# Patient Record
Sex: FEMALE | Race: WHITE
[De-identification: names, ages, dates, MRNs, and addresses within clinical notes are randomized per-mention and may not be internally consistent; named-entity substitution may affect disease eponyms.]

---

## 2017-02-21 NOTE — HISTORY & PHYSICAL PRE-OP
General Information and HPI
MD Statement:
I have seen and personally examined CJ YORK and documented this H&P.
 
The patient is a 36 year old F who presented with a patient stated chief 
complaint of [].
 
History of Present Illness:
37yo  with heavy menses and permanent sterilazation desires ablation of 
endometrium.
 
Allergies/Medications
Allergies:
Coded Allergies:
NO KNOWN ALLERGIES (17)
 
Home Med list
Gabapentin (Neurontin) 100 MG CAPSULE   200 MG PO QPM SLEEP  (Reported)
Paroxetine HCl (Paxil) 20 MG TABLET   1 TAB PO DAILY ANXIETY  (Reported)
 
 
Past History
 
Surgical History
Pertinent Surgical History: tubal ligation
 
Review of Systems
 
Review of Systems
Constitutional:
Reports: no symptoms. 
EENTM:
Reports: no symptoms. 
Cardiovascular:
Reports: no symptoms. 
Respiratory:
Reports: no symptoms. 
GI:
Reports: no symptoms. 
Genitourinary:
Reports: no symptoms. 
Musculoskeletal:
Reports: no symptoms. 
Skin:
Reports: no symptoms. 
Neurological/Psychological:
Reports: no symptoms. 
Hematologic/Endocrine:
Reports: no symptoms. 
Immunologic/Allergic:
Reports: no symptoms. 
All Other Systems: Reviewed and Negative
 
Exam & Diagnostic Data
Last 24 Hrs of Vital Signs/I&O
vss
Physical Exam:
HEENT: NCAT
Chest: CTA
CV: nl S1S2
ext: no c/c/e
 
Assessment/Plan
Assessment/Plan:
menorrhagia
 
D&C, hyteroscopy, Novasure
 
As Ranked By This Provider
Problem List:
 1. Menorrhagia

## 2017-02-22 ENCOUNTER — HOSPITAL ENCOUNTER (OUTPATIENT)
Dept: HOSPITAL 68 - STS | Age: 37
End: 2017-02-22
Attending: OBSTETRICS & GYNECOLOGY
Payer: COMMERCIAL

## 2017-02-22 VITALS — WEIGHT: 228 LBS | HEIGHT: 65 IN | BODY MASS INDEX: 37.99 KG/M2

## 2017-02-22 DIAGNOSIS — F17.200: ICD-10-CM

## 2017-02-22 DIAGNOSIS — N92.1: Primary | ICD-10-CM

## 2017-02-22 DIAGNOSIS — N72: ICD-10-CM

## 2017-02-22 NOTE — OPERATIVE REPORT
Operative/Inv Procedure Report
Surgery Date: 02/22/17
Name of Procedure:
D&C, hysteroscopy, NovaSure endometrial ablation
Pre-Operative Diagnosis:
Menorrhagia
Post-Operative Diagnosis:
Same
Estimated Blood Loss: less than 50ml
Surgeon/Assistant:
DARREN BRUMFIELD MD
 
Anesthesia: laryngeal mask airway
 
Operative/Procedure Note
Note:
The patient was brought to the operating room and placed on the table in the 
dorsal supine position.  She was given adequate anesthesia and intubated with an
LMA.  She was repositioned in modified dorsal lithotomy prepped and draped in 
usual sterile fashion.  A weighted speculum was inserted into the vagina with 
help of a Randy retractor single-tooth tenaculum was attached to the anterior 
lip of the cervix.  Cervix was injected with 1% lidocaine with epinephrine 2-1/2
mL in each quadrant.  An endocervical curettage was performed.  The uterus is 
then sounded to 9 cm with a 4 cm cervix.  The cervix was serially dilated to 
accommodate the hysteroscope hysteroscope was placed and the saline infusion was
activated.  Shaggy endometrium 2 polyps were noted throughout.  The hysteroscope
was removed and the cervix was further dilated.  The NovaSure was placed into 
the uterus and opened to a width of 3-1/2 cm.  The NovaSure was begun at [address] 
and completed in less than 2 minutes.  At the end of the procedure the 
instruments were removed hemostasis was verified.  There was some bleeding noted
from the patient's left tenaculum site and a figure-of-eight 3-0 Polysorb was 
placed here for hemostasis.  The entrance returned then removed patient was 
awakened and sent to recovery in good condition
 
All needle, sponge and instrument counts were correct at the end of the 
procedure 2.

## 2017-07-05 ENCOUNTER — HOSPITAL ENCOUNTER (INPATIENT)
Dept: HOSPITAL 68 - SDA | Age: 37
LOS: 1 days | DRG: 743 | End: 2017-07-06
Attending: OBSTETRICS & GYNECOLOGY | Admitting: OBSTETRICS & GYNECOLOGY
Payer: COMMERCIAL

## 2017-07-05 VITALS — SYSTOLIC BLOOD PRESSURE: 118 MMHG | DIASTOLIC BLOOD PRESSURE: 60 MMHG

## 2017-07-05 VITALS — WEIGHT: 200 LBS | HEIGHT: 66 IN | BODY MASS INDEX: 32.14 KG/M2

## 2017-07-05 VITALS — DIASTOLIC BLOOD PRESSURE: 68 MMHG | SYSTOLIC BLOOD PRESSURE: 98 MMHG

## 2017-07-05 VITALS — SYSTOLIC BLOOD PRESSURE: 120 MMHG | DIASTOLIC BLOOD PRESSURE: 74 MMHG

## 2017-07-05 DIAGNOSIS — F17.210: ICD-10-CM

## 2017-07-05 DIAGNOSIS — F41.9: ICD-10-CM

## 2017-07-05 DIAGNOSIS — N92.0: Primary | ICD-10-CM

## 2017-07-05 DIAGNOSIS — G25.81: ICD-10-CM

## 2017-07-05 DIAGNOSIS — N39.3: ICD-10-CM

## 2017-07-05 LAB
ABSOLUTE GRANULOCYTE CT: 14.8 /CUMM (ref 1.4–6.5)
ABSOLUTE GRANULOCYTE CT: 16.1 /CUMM (ref 1.4–6.5)
ABSOLUTE GRANULOCYTE CT: 7.5 /CUMM (ref 1.4–6.5)
BASOPHILS # BLD: 0 /CUMM (ref 0–0.2)
BASOPHILS # BLD: 0 /CUMM (ref 0–0.2)
BASOPHILS # BLD: 0.1 /CUMM (ref 0–0.2)
BASOPHILS NFR BLD: 0 % (ref 0–2)
BASOPHILS NFR BLD: 0.1 % (ref 0–2)
BASOPHILS NFR BLD: 0.8 % (ref 0–2)
EOSINOPHIL # BLD: 0 /CUMM (ref 0–0.7)
EOSINOPHIL # BLD: 0 /CUMM (ref 0–0.7)
EOSINOPHIL # BLD: 0.3 /CUMM (ref 0–0.7)
EOSINOPHIL NFR BLD: 0 % (ref 0–5)
EOSINOPHIL NFR BLD: 0 % (ref 0–5)
EOSINOPHIL NFR BLD: 2.6 % (ref 0–5)
ERYTHROCYTE [DISTWIDTH] IN BLOOD BY AUTOMATED COUNT: 17.3 % (ref 11.5–14.5)
ERYTHROCYTE [DISTWIDTH] IN BLOOD BY AUTOMATED COUNT: 17.5 % (ref 11.5–14.5)
ERYTHROCYTE [DISTWIDTH] IN BLOOD BY AUTOMATED COUNT: 17.6 % (ref 11.5–14.5)
GRANULOCYTES NFR BLD: 70.7 % (ref 42.2–75.2)
GRANULOCYTES NFR BLD: 93.6 % (ref 42.2–75.2)
GRANULOCYTES NFR BLD: 95.3 % (ref 42.2–75.2)
HCT VFR BLD CALC: 37.6 % (ref 37–47)
HCT VFR BLD CALC: 38.8 % (ref 37–47)
HCT VFR BLD CALC: 40 % (ref 37–47)
LYMPHOCYTES # BLD: 0.7 /CUMM (ref 1.2–3.4)
LYMPHOCYTES # BLD: 0.8 /CUMM (ref 1.2–3.4)
LYMPHOCYTES # BLD: 2.3 /CUMM (ref 1.2–3.4)
MCH RBC QN AUTO: 26.2 PG (ref 27–31)
MCH RBC QN AUTO: 26.2 PG (ref 27–31)
MCH RBC QN AUTO: 26.3 PG (ref 27–31)
MCHC RBC AUTO-ENTMCNC: 32.5 G/DL (ref 33–37)
MCHC RBC AUTO-ENTMCNC: 32.6 G/DL (ref 33–37)
MCHC RBC AUTO-ENTMCNC: 33.3 G/DL (ref 33–37)
MCV RBC AUTO: 79 FL (ref 81–99)
MCV RBC AUTO: 80.2 FL (ref 81–99)
MCV RBC AUTO: 80.4 FL (ref 81–99)
MONOCYTES # BLD: 0.1 /CUMM (ref 0.1–0.6)
MONOCYTES # BLD: 0.2 /CUMM (ref 0.1–0.6)
MONOCYTES # BLD: 0.4 /CUMM (ref 0.1–0.6)
PLATELET # BLD: 269 /CUMM (ref 130–400)
PLATELET # BLD: 270 /CUMM (ref 130–400)
PLATELET # BLD: 271 /CUMM (ref 130–400)
PMV BLD AUTO: 9.1 FL (ref 7.4–10.4)
RED BLOOD CELL CT: 4.68 /CUMM (ref 4.2–5.4)
RED BLOOD CELL CT: 4.91 /CUMM (ref 4.2–5.4)
RED BLOOD CELL CT: 4.98 /CUMM (ref 4.2–5.4)
WBC # BLD AUTO: 10.6 /CUMM (ref 4.8–10.8)
WBC # BLD AUTO: 15.8 /CUMM (ref 4.8–10.8)
WBC # BLD AUTO: 16.9 /CUMM (ref 4.8–10.8)

## 2017-07-05 PROCEDURE — 0UTC7ZZ RESECTION OF CERVIX, VIA NATURAL OR ARTIFICIAL OPENING: ICD-10-PCS | Performed by: OBSTETRICS & GYNECOLOGY

## 2017-07-05 PROCEDURE — C1771 REP DEV, URINARY, W/SLING: HCPCS

## 2017-07-05 PROCEDURE — 0UT97ZZ RESECTION OF UTERUS, VIA NATURAL OR ARTIFICIAL OPENING: ICD-10-PCS | Performed by: OBSTETRICS & GYNECOLOGY

## 2017-07-05 PROCEDURE — 0TSD0ZZ REPOSITION URETHRA, OPEN APPROACH: ICD-10-PCS | Performed by: UROLOGY

## 2017-07-05 PROCEDURE — P9016 RBC LEUKOCYTES REDUCED: HCPCS

## 2017-07-05 NOTE — NUR
PATIENT REQUESTING NIGHTTIME MEDS-
GABAPENTIN 200 MG PO AND PAXIL 20 MG PO
PER TELEPHONE ORDER FROM ISELA UREÑA TO GIVE.
ORDERS FAXED TO PHARMACY.

## 2017-07-05 NOTE — OPERATIVE REPORT
Operative/Inv Procedure Report
Surgery Date: 07/05/17
Name of Procedure:
urethral sling, cystoscopy
Pre-Operative Diagnosis:
stress incontinence
Post-Operative Diagnosis:
same
Estimated Blood Loss: 50ml to 100ml
Surgeon/Assistant:
Sharon Lam
 
Anesthesia: general endotracheal tube
Implants:
vaginal mesh
Drains:
16fr guallpa
Complications:
none
Condition:
stable
Operative Indication:
stress incontinence
 
Operative/Procedure Note
Note:
This an operative dictation on patient Dee Fuentes.  Patient was identified
in the holding area and consented for a urethral sling and cystoscopy.  This was
a additional procedure to the vaginal hysterectomy she was having with and kingston Lacy.  The risks benefits and alternatives of the surgery were given and all 
questions were answered.  She wished to proceed.
 
Patient was taken to the operating room by Dr. Oquendo and had the 
vaginal hysterectomy procedure with Ajay Modi MD as the assistant.  Once
they completed the hysterectomy portion of the case, I began the urethral sling 
portion.  A 16 Moldovan Guallpa catheter was placed in the bladder was emptied.  The
full Guallpa was clamped and placed on the patient's abdomen.  0.5% Marcaine with 
epinephrine was infiltrated into the anterior vaginal wall.  Incision was made 
beneath the urethra and the vaginal flaps are created taking care not to injure 
the urethra.  The Altis Sling kit was then opened and the trochars provided were
used to place the sling in the patient's left and right obturator fascia.  Sling
was seen to be in good position and the Prolene tightening suture was then used 
to tighten the urethral sling mesh ensuring that it was in a flat orientation 
with a DeBakey between the urethra and the sling.  Once it was seen to be in 
good position the tightening suture was cut and the area was copiously irrigated
with bacitracin irrigation.  Incision was closed with 3-0 Vicryl running locking
every third suture.  There was no vaginal mesh within the vaginal fornices.  A 
cystoscopy was performed the bladder was globally inspected.  There was no mesh 
in the bladder or the urethra.  Methylene blue had been given prior to the start
of the sling to ensure the patency of the ureteral orifices after the vaginal 
hysterectomy.  There was excellent blue E Deflux emanating from both ureteral 
orifices.  The bladder was emptied.  The sponge and needle count were correct at
the end of the case.  2 inch vaginal packing impregnated with bacitracin 
ointment was placed into the vaginal vault.  Patient tolerated the procedure 
well.
Findings:
no mesh in bladder, urethra or vaginal fornices.
Excellent blue efflux from both ureteral orifices
Discharge Disposition: PACU

## 2017-07-05 NOTE — NUR
QUESTION REGARDING ADDITIONAL DOSE OF ANTIBIOTICS-
REC'D ANCEF IN OR AT 0930
GUMARO RODRIGES, CALLED DR BRUMFIELD'S OFFICE/ANSWERING SERVICE 2X BEFORE SHE
LEFT, NO CALL BACK AT THIS TIME
I JUST CALLED AND LEFT A MESSAGE WITH THE ANSWERING SERVICE, DR FUCHS IS
COVERING NOW.  WILL AWAIT CALL BACK...

## 2017-07-06 VITALS — DIASTOLIC BLOOD PRESSURE: 60 MMHG | SYSTOLIC BLOOD PRESSURE: 102 MMHG

## 2017-07-06 VITALS — DIASTOLIC BLOOD PRESSURE: 60 MMHG | SYSTOLIC BLOOD PRESSURE: 106 MMHG

## 2017-07-06 LAB
ABSOLUTE GRANULOCYTE CT: 12.1 /CUMM (ref 1.4–6.5)
BASOPHILS # BLD: 0 /CUMM (ref 0–0.2)
BASOPHILS NFR BLD: 0.3 % (ref 0–2)
EOSINOPHIL # BLD: 0 /CUMM (ref 0–0.7)
EOSINOPHIL NFR BLD: 0 % (ref 0–5)
ERYTHROCYTE [DISTWIDTH] IN BLOOD BY AUTOMATED COUNT: 17.5 % (ref 11.5–14.5)
GRANULOCYTES NFR BLD: 82.2 % (ref 42.2–75.2)
HCT VFR BLD CALC: 35.7 % (ref 37–47)
LYMPHOCYTES # BLD: 1.9 /CUMM (ref 1.2–3.4)
MCH RBC QN AUTO: 26.5 PG (ref 27–31)
MCHC RBC AUTO-ENTMCNC: 32.5 G/DL (ref 33–37)
MCV RBC AUTO: 81.3 FL (ref 81–99)
MONOCYTES # BLD: 0.7 /CUMM (ref 0.1–0.6)
PLATELET # BLD: 258 /CUMM (ref 130–400)
PMV BLD AUTO: 9.4 FL (ref 7.4–10.4)
RED BLOOD CELL CT: 4.39 /CUMM (ref 4.2–5.4)
WBC # BLD AUTO: 14.7 /CUMM (ref 4.8–10.8)

## 2017-07-06 NOTE — SURGICAL DISCHARGE SUMMARY
Visit Information
 
Visit Dates
Admission Date:
07/05/17
 
Discharge Date:
7/6/17
 
 
History of Present Illness
Chief Complaint:
MENORRHAGIA
 
Medical History
Blood Transfusion Hx: No
Neurological: NONE
EENT: NONE
Cardiovascular: NONE
Respiratory: NONE
Gastrointestinal: NONE
Hepatic: NONE
Renal: NONE
Musculoskeletal: NONE
Psychiatric: NONE
Endocrine: NONE
Blood Disorders: NONE
Cancer(s): NONE
GYN/Reproductive: EXCESSIVE BLEEDING
History of MRSA: No
History of VRE: No
History of CDIFF: No
Isolation History: Standard
 
Surgical History
Pertinent Surgical History: hysterectomy, tubal ligation, endometrial ablation
 
Psychosocial History
Where Do You Live? Home
Who Do You Live With? Friend
What is Your Primary Language? English
Review of Systems:
NEG
 
Hospital Course
 
Course
Attending Physician:
DARREN BRUMFIELD MD
 
[facility] Care Phys[facility]
PT UNDERSENT TVH AND SLING WIHOUT COMPLICATION.  SENT TO  IN SATISFACTORY 
CONDITION. POD 1 PT WITHOUT C/O. PACKING REMOVED.  H&H STABLE. VITAL SIGNS 
STABLE AND AFEBRILE.  VOIDING TRIAL SUCCESSFUL AND PT DISCHARGED HOME.
Allergies:
Coded Allergies:
NO KNOWN ALLERGIES (02/16/17)
 
 
Disposition Summary
 
Disposition
Principal Diagnosis:
MENORRHAGIA
Additional Diagnosis:
S/P VAGINAL HYSTERECTOMY
Discharge Disposition: home or self care
 
Discharge Instructions
 
General Discharge Information
Code Status: Full Code
Patient's Diet:
REGULAR
Patient's Activity:
PELVIC REST
Follow-Up Instructions/Appts:
1 WK MANOLO
2 WK ELIGIO ODELL
 
Medications at Discharge
Discharge Medications:
Continue taking these medications:
Paroxetine HCl (Paxil) 20 MG TABLET
    1 Tablet ORAL NIGHTLY
    Comments:
       NOT GIVEN IN HOSPITAL
 
Gabapentin (Neurontin) 100 MG CAPSULE
    200 Milligram ORAL Every night
    Comments:
       NOT GIVEN IN HOSPITAL
 
Start taking the following new medications:
Ibuprofen (Ibuprofen) 800 MG TABLET
    800 Milligram ORAL EVERY 8 HOURS AS NEEDED as needed for MILD PAIN 1-3
    Qty = 16
    No Refills
    Comments:
       NOT GIVEN IN HOSPITAL
 
Oxycodone HCl/Acetaminophen (Percocet 5-325 MG Tablet) 5 MG-325 MG TABLET
    1 Tablet ORAL EVERY 4 HOURS AS NEEDED as needed for SEVERE PAIN 7-10
    Qty = 15
    No Refills
    Comments:
       Last Taken: 7/6/17
             Time: 1030 AM

## 2017-07-06 NOTE — NUR
NURSING NOTE: PATIENT LEFT FLOOR VIA STRETCHER WITH [facility] FOR DISCHARGE TO SNF.
PATIENT A/OX3, DENIES PAIN AT THIS TIME. IV DISCONTINUED. ALL BELONGINGS LEFT
WITH PATIENT. DISCHARGE PAPERWORK GIVEN TO [facility] STAFF.

## 2017-07-06 NOTE — NUR
NURSING NOTE: PATIENT LEFT FLOOR ON OWN AMBULATION FOR DISCHARGE HOME. PATIENT
A/OX3, STEADY GAIT, ALL PRESCRIPTIONS AND DISCHARGE PAPERWORK GIVEN TO PATIENT
AND EXPLAINED. PATIENT HAD NO QUESTIONS FOLLOWING DISCHARGE INSTRUCTIONS. IV
DISCONTINUED.

## 2017-07-06 NOTE — PN- GENERAL SURGERY
Subjective
Subjective:
NO C/O
Review of Systems:
NEG
 
Objective
Vital Signs and I&Os
Vital Signs
 
 
Date Time Temp Pulse Resp B/P B/P Pulse O2 O2 Flow FiO2
 
     Mean Ox Delivery Rate 
 
07/06 0652 98.6 72 20 102/60  97 Room Air  
 
07/05 2247 98.3 83 20 118/60  94   
 
07/05 1700  77  120/74  95 Room Air  
 
07/05 1545      96 Nasal 1.0L 
 
       Cannula  
 
07/05 1450 97.9 75 16 98/68  96 Nasal 2.0L 
 
       Cannula  
 
 
 Intake & Output
 
 
 07/06 1600 07/06 0800 07/06 0000 07/05 1600 07/05 0800 07/05 0000
 
Intake Total  1240 3975   
 
Output Total  650 1825   
 
Balance  590 2150   
 
       
 
Intake, IV  1000 3375   
 
Intake, Oral  240 600   
 
Output, Other   200   
 
Output, Urine  650 1625   
 
Patient    200 lb  
 
Weight      
 
Weight    Reported by Patient  
 
Measurement      
 
Method      
 
 
 
Physical Exam:
ABD SOFT
EXT NT
VAG PAKING REMOVED
 
Assessment/Plan
Assessment/Plan
S/P TVH/SLING
BERNARD REMOVED; VOIDING TRIAL; IF PASSES, DISCHARGE HOME
IF FAILS, DISCHARGE HOME WITH BERNARD/LEG BAG
F/U LEEANNE 1 WEEK
F/U VV 2 WEEKS
Problem List:
 1. Menorrhagia
 
 
Core Measures/Miscellaneous
 
Venous Thromboembolism
VTE Risk Factors: Surgery
VTE Contraindications: No Contraindications
VTE Diagnosis: No
 
Beta Blocker
Is Beta Blocker a Home Med? No
 
Antibiotics
Is Patient on Antibiotics? No

## 2017-07-07 NOTE — OPERATIVE REPORT
Operative/Inv Procedure Report
Surgery Date: 07/05/17
Name of Procedure:
Total vaginal hysterectomy
Pre-Operative Diagnosis:
Menorrhagia
Post-Operative Diagnosis:
Same
Estimated Blood Loss: 50ml to 100ml
Surgeon/Assistant:
ELIGIO ODELL MD,DARREN Luu M.D.
 
Anesthesia: general endotracheal tube
 
Operative/Procedure Note
Note:
The patient was brought to the operating room placed on the OR table in the 
dorsal supine position.  She was given adequate anesthesia and successfully 
intubated.  She was repositioned in modified dorsal lithotomy.  She was prepped 
and draped in usual sterile fashion.  A weighted speculum was inserted into the 
vagina with help of a [place] retractor single-tooth tenaculum was attached to 
the anterior lip of the cervix in 1 to the posterior lip of the cervix.  The 
cervical vaginal junction was injected with Pitressin as well as lidocaine with 
epinephrine.  Using the Bovie circumferentially incision was made and this was 
pushed back to reveal the peritoneum.  The peritoneum was grasped anteriorly 
with a forcep and entered sharply with the Montez scissors.  The Randy retractor 
was placed into the peritoneal cavity through this opening.  Posteriorly the 
vaginal mucosa was pushed back into the peritoneum was noted grasped and entered
sharply with the Montez scissors as well.  The gooseneck weighted speculum was 
inserted through this opening throughout the procedure.  Traction was held on 
the cervix throughout the procedure.  The right uterosacral ligament was clamped
transected and suture ligated with 0 Polysorb and tagged.  The same procedure is
repeated on the left good hemostasis.  The right uterine artery was left with a 
Alaina clamp transected suture ligated with 0 Polysorb with good hemostasis.  
Same procedure is repeated on the left.  The right cardinal ligaments were then 
transected using clamped transected and suture ligated with 0 Polysorb.  The 
same procedure is repeated on the left.  There was some bleeding noted from a 
loose pedicle and this was grasped again with a Alaina clamp and suture ligated 
with 0 Polysorb to good effect.  The right utero-ovarian complex was grasped 
with a Alaina clamp and suture ligated with 0 Polysorb with good effect.  Same 
procedure is repeated on the right with good hemostasis.  The specimen was 
removed and sent to pathology.  All pedicles were visualized and there was some 
bleeding noted to be coming from the right uterine artery pedicle this was 
clamped and suture ligated with 0 Polysorb with good hemostasis.  Further 
inspection revealed no bleeding pedicles.  The vaginal cuff was then oversewn 
using 0 Polysorb in a running locking fashion.  The uterosacral ligaments were 
plicated and attached to the vaginal cuff with 0 Polysorb in a free suture.  At 
this point the patient was left for Dr. Lam to perform her sling operation.  
At the end of this procedure WERE correct of needle instruments and pads 2.

## 2018-01-31 ENCOUNTER — HOSPITAL ENCOUNTER (EMERGENCY)
Dept: HOSPITAL 68 - ERH | Age: 38
End: 2018-01-31
Payer: COMMERCIAL

## 2018-01-31 VITALS — SYSTOLIC BLOOD PRESSURE: 127 MMHG | DIASTOLIC BLOOD PRESSURE: 84 MMHG

## 2018-01-31 VITALS — WEIGHT: 230 LBS | BODY MASS INDEX: 38.32 KG/M2 | HEIGHT: 65 IN

## 2018-01-31 DIAGNOSIS — Z87.891: ICD-10-CM

## 2018-01-31 DIAGNOSIS — R06.02: Primary | ICD-10-CM

## 2018-01-31 LAB
ABSOLUTE GRANULOCYTE CT: 6.2 /CUMM (ref 1.4–6.5)
BASOPHILS # BLD: 0 /CUMM (ref 0–0.2)
BASOPHILS NFR BLD: 0.3 % (ref 0–2)
EOSINOPHIL # BLD: 0.4 /CUMM (ref 0–0.7)
EOSINOPHIL NFR BLD: 4.1 % (ref 0–5)
ERYTHROCYTE [DISTWIDTH] IN BLOOD BY AUTOMATED COUNT: 15.5 % (ref 11.5–14.5)
GRANULOCYTES NFR BLD: 67.8 % (ref 42.2–75.2)
HCT VFR BLD CALC: 42.4 % (ref 37–47)
LYMPHOCYTES # BLD: 2.1 /CUMM (ref 1.2–3.4)
MCH RBC QN AUTO: 26.8 PG (ref 27–31)
MCHC RBC AUTO-ENTMCNC: 32.7 G/DL (ref 33–37)
MCV RBC AUTO: 82 FL (ref 81–99)
MONOCYTES # BLD: 0.5 /CUMM (ref 0.1–0.6)
PLATELET # BLD: 327 /CUMM (ref 130–400)
PMV BLD AUTO: 8.4 FL (ref 7.4–10.4)
RED BLOOD CELL CT: 5.18 /CUMM (ref 4.2–5.4)
WBC # BLD AUTO: 9.2 /CUMM (ref 4.8–10.8)

## 2018-01-31 NOTE — RADIOLOGY REPORT
EXAMINATION:
XR CHEST
 
CLINICAL INFORMATION:
Shortness of breath. Worsening on exertion.
 
COMPARISON:
None
 
TECHNIQUE:
2 views of the chest were obtained.
 
FINDINGS:
No significant abnormality is noted involving the heart, lungs, mediastinum,
bony thorax or soft tissues.
 
IMPRESSION:
Unremarkable examination.

## 2018-01-31 NOTE — ED DYSPNEA/ASTHMA COMPLAINT
History of Present Illness
 
General
Chief Complaint: Dyspnea (COPD, CHF, Other)
Stated Complaint: SOB
Source: patient
Exam Limitations: no limitations
 
Vital Signs & Intake/Output
Vital Signs & Intake/Output
 Vital Signs
 
 
Date Time Temp Pulse Resp B/P B/P Pulse O2 O2 Flow FiO2
 
     Mean Ox Delivery Rate 
 
 1728      96   
 
 1501 97.3 110 20 127/84  94 Room Air  
 
 
 
Allergies
Coded Allergies:
NO KNOWN ALLERGIES (17)
 
Reconcile Medications
Albuterol Sulfate (Proair Hfa) 90 MCG HFA.AER.AD   2 PUF INH Q4-6 PRN PRN SOB
Gabapentin (Neurontin) 100 MG CAPSULE   200 MG PO QPM SLEEP  (Reported)
Ibuprofen 800 MG TABLET   800 MG PO Q8P PRN MILD PAIN 1-3
Oxycodone HCl/Acetaminophen (Percocet 5-325 MG Tablet) 5 MG-325 MG TABLET   1 
TAB PO Q4P PRN SEVERE PAIN 7-10
Paroxetine HCl (Paxil) 20 MG TABLET   1 TAB PO NIGHTLY ANXIETY  (Reported)
Prednisone 50 MG TABLET   1 TAB PO DAILY BRONCHITIS 
 
Triage Note:
PT TO ED C/O "I CAN'T BREATH". PT WAS SEEN AT
URGENT CARE ON MONDAY AND SENT TO ED FOR EVAL BUT
PT DID NOT STAY FOR EVAL DUE TO WAIT TIME.
WENT TO PCP TODAY FOR STILL NOT FEELING WELL AND
WAS ADVISED TO COME TO ED. RA SATS 95%. PT APPEARS
SOB WITH SITTING. PCP CALLED AND WOULD LIKE FLU
SWAB, CXR, AND R/O PE.
Triage Nurses Notes Reviewed? yes
Onset: Abrupt
Duration: day(s): (3), constant, changing over time
Timing: single episode today
Severity: mild, moderate
Activities at Onset: none
Prior Episodes/Possible Cause: occasional episodes
Modifying Factors:
Worsens With: movement. 
Associated Symptoms: anxiety, cough, wheezing
LMP (ages 10-50): unknown
Pregnant: No
Patient currently breastfeeds: No
HPI:
37 YEAR OLD FEMALE WITH NO PMH PRESENTS FOR EVAL OF SOB. PT RPEORTS SYMPTOMS 
STARTED ABOUT 3-4 DAYS AGO AND GETTING WORSE.  Patient reports shortness of 
breath both at rest that worsens with exertion.  She reports cough productive of
CLEAR sputum that is worse at night causing difficult is sleeping.  She also 
reports chest pain with cough.  No hemoptysis lower extremity edema nausea 
vomiting or diarrhea.  No history of underlying lung disease.  She does not have
a prescription for albuterol.  She quit smoking about a month ago.  Nose 
negative family history of heart disease.  No recent surgery or trauma.
(Romaine Emmanuel)
 
Past History
 
Travel History
Traveled to [place] past 21 day No
 
Medical History
Any Pertinent Medical History? see below for history
Neurological: NONE
EENT: NONE
Cardiovascular: NONE
Respiratory: NONE
Gastrointestinal: NONE
Hepatic: NONE
Renal: NONE
Musculoskeletal: NONE
Psychiatric: NONE
Endocrine: NONE
Blood Disorders: NONE
Cancer(s): NONE
GYN/Reproductive: EXCESSIVE BLEEDING
History of MRSA: No
History of VRE: No
History of CDIFF: No
 
Surgical History
Surgical History: hysterectomy, tubal ligation, endometrial ablation
 
Psychosocial History
Who do you live with Friend
What is your primary language English
Tobacco Use: Quit >30 days ago
ETOH Use: denies use
Illicit Drug Use: denies illicit drug use
 
Family History
Hx Contributory? No
(Romaine Emmanuel)
 
Review of Systems
 
Review of Systems
Constitutional:
Reports: weakness. 
EENTM:
Reports: nasal congestion. 
Respiratory:
Reports: see HPI, cough, short of breath, sputum production, wheezing. 
Cardiovascular:
Reports: see HPI, chest pain. 
GI:
Reports: no symptoms. 
Genitourinary:
Reports: no symptoms. 
Musculoskeletal:
Reports: no symptoms. 
Skin:
Reports: no symptoms. 
Neurological/Psychological:
Reports: no symptoms. 
Hematologic/Endocrine:
Reports: no symptoms. 
Immunologic/Allergic:
Reports: no symptoms. 
All Other Systems: Reviewed and Negative
(Romaine Emmanuel)
 
Physical Exam
 
Physical Exam
General Appearance: well developed/nourished, no apparent distress, alert, awake
Head: atraumatic, normal appearance
Eyes:
Bilateral: normal appearance, PERRL, EOMI. 
Ears, Nose, Throat: normal pharynx, hearing grossly normal, nasal congestion, 
moist mucus membranes
Neck: normal inspection, supple, full range of motion, NO jvd
Respiratory: no respiratory distress, rhonchi, wheezing, CHEST WALL TENDER TO 
PALPATION OVER THE STERNUM RIGHT AND LEFT CHEST
Cardiovascular: regular rate/rhythm, normal peripheral pulses
Peripheral Pulses:
2+ radial (R), 2+ radial (L)
Gastrointestinal: soft, non-tender
Extremities: normal inspection, normal range of motion, no edema
Neurologic/Psych: no motor/sensory deficits, awake, alert, oriented x 3, normal 
gait
Skin: intact, normal color, warm/dry
Lymphatic: no anterior cervical kristine
 
Core Measures
ACS in differential dx? No
CVA/TIA Diagnosis No
Sepsis Present: No
Sepsis Focused Exam Completed? No
(Francisco Javier PETERS,Romaine)
 
Progress
Differential Diagnosis: asthma, AMI, bronchitis, costochondritis, CHF, COPD, 
musculoskeletal pain, pericarditis, pulmonary embolism, pneumonia, pneumothorax,
rib fracture
Plan of Care:
 Orders
 
 
Procedure Date/time Status
 
Add-on Test (ER Only)  1530 Active
 
TROPONIN LEVEL  1521 Complete
 
MAGNESIUM  1521 Complete
 
HUMAN BETA HCG SCREEN  1521 Complete
 
RAPID VIRAL INFLUENZA A  1508 Complete
 
D-DIMER  1504 Complete
 
COMPREHENSIVE METABOLIC PANEL  1504 Complete
 
CBC WITHOUT DIFFERENTIAL  1504 Complete
 
EKG  1504 Active
 
 
 Laboratory Tests
 
 
 
18 1558:
Anion Gap 13, Estimated GFR > 60, BUN/Creatinine Ratio 15.7, Glucose 92, Calcium
8.9, Magnesium 1.9, Total Bilirubin 0.1  L, AST 14, ALT 27, Alkaline Phosphatase
138  H, Troponin I < 0.01, Total Protein 7.1, Albumin 4.1, Globulin 3.0, Albumin
/Globulin Ratio 1.4, Total Beta HCG NEGATIVE, D-Dimer High Sensitivty < 200, CBC
w Diff NO MAN DIFF REQ, RBC 5.18, MCV 82.0, MCH 26.8  L, MCHC 32.7  L, RDW 15.5 
H, MPV 8.4, Gran % 67.8, Lymphocytes % 22.5, Monocytes % 5.3, Eosinophils % 4.1,
Basophils % 0.3, Absolute Granulocytes 6.2, Absolute Lymphocytes 2.1, Absolute 
Monocytes 0.5, Absolute Eosinophils 0.4, Absolute Basophils 0
 Microbiology
 1510  NASOPHARYN: Influenza Virus A & B Rapid Smear - COMP
 
Patient seen and evaluated.  She currently is afebrile and no evidence of 
respiratory distress.  She does have wheezing and rhonchi auscultated 
bilaterally.  This was improved after DuoNeb.  Her chest wall is tender to 
palpation.  Chest x-ray does not show any evidence of pneumonia.  D-dimer is 
negative EKG and troponin negative and stable.  Patient will be treated for 
acute bronchitis with prednisone and albuterol.  Advised her to also use Mucinex
/Robitussin for cough Tylenol or IBUProfen for pain rest and drink plenty of 
fluids.  Discussed return precautions in detail patient is nontoxic-appearing 
and agrees the plan.
Diagnostic Imaging:
Viewed by Me: Radiology Read.  Discussed w/RAD: Radiology Read. 
CXR Impression: PATIENT: CJ YORK  MEDICAL RECORD NO: [id number] PRESENT 
AGE: 37  PATIENT ACCOUNT NO: [id number] : 79  LOCATION: [facility] ORDERING 
PHYSICIAN: Romaine PETERS     SERVICE DATE:  EXAM TYPE: RAD - XRY-
CHEST XRAY, TWO VIEWS EXAMINATION: XR CHEST CLINICAL INFORMATION: Shortness of 
breath. Worsening on exertion. COMPARISON: None TECHNIQUE: 2 views of the chest 
were obtained. FINDINGS: No significant abnormality is noted involving the heart
, lungs, mediastinum, bony thorax or soft tissues. IMPRESSION: Unremarkable 
examination. DICTATED BY: Enoc Ruiz MD  DATE/TIME DICTATED:18 
:RAD.MAYORGA  DATE/TIME TRANSCRIBED:18 CONFIDENTIAL, 
DO NOT COPY WITHOUT APPROPRIATE AUTHORIZATION.
Initial ED EKG: normal sinus rhythm (RATE 98)
(Romaine Emmanuel)
 
Departure
 
Departure
Disposition: HOME OR SELF CARE
Condition: Stable
Clinical Impression
Primary Impression: Shortness of breath
Referrals:
Patient Has No Primary Care Dr (PCP/Family)
 
Additional Instructions:
Rest and drink plenty of fluids.  Take steroids as directed for the full course.
 Use Proventil inhaler 2 puffs every 4-6 hours as needed for cough or shortness 
of breath.  MAKE A follow-up appointment with YOUR primary care doctor as soon 
as possible.  Monitor symptoms return with any concerns.
Departure Forms:
Customer Survey
General Discharge Information
Prescriptions:
Current Visit Scripts
Prednisone 1 TAB PO DAILY  
     #5 TAB 
 
Albuterol Sulfate (Proair Hfa) 2 PUF INH Q4-6 PRN PRN SOB 
     #1 INHAL 
 
 
(Romaine Emmanuel)
 
PA/NP Co-Sign Statement
Statement:
ED Attending supervision documentation-
 
[] I saw and evaluated the patient. I have also reviewed all the pertinent lab 
results and diagnostic results. I agree with the findings and the plan of care 
as documented in the PA's/NP's documentation. 
 
[X] I have reviewed the ED Record and agree with the PA's/NP's documentation.
 
[] Additions or exceptions (if any) to the PAs/NP's note and plan are 
summarized below:
[]
 
(Zacarias Moran DO)
 
Critical Care Note
 
Critical Care Note
Critical Care Time: non-applicable
(Romaine Emamnuel)
 
ED Attending Observation
Initial Observation Note:
I have seen and personally examined CJ YORK 
on 18 at 1412. 
 
I agree with the current emergency department documentation.  The disposition 
(admission or discharge) is uncertain at this time, she needs a period of 
observation for the following reason(s): 
 
The ED Nurse caring for this patient has been personally informed as to what the
patient is being observed for.
 
(Romaine Emmanuel)

## 2018-09-26 ENCOUNTER — HOSPITAL ENCOUNTER (OUTPATIENT)
Dept: HOSPITAL 68 - ERH | Age: 38
Setting detail: OBSERVATION
LOS: 1 days | End: 2018-09-27
Attending: SURGERY | Admitting: SURGERY
Payer: COMMERCIAL

## 2018-09-26 VITALS — HEIGHT: 65 IN | WEIGHT: 230 LBS | BODY MASS INDEX: 38.32 KG/M2

## 2018-09-26 VITALS — DIASTOLIC BLOOD PRESSURE: 74 MMHG | SYSTOLIC BLOOD PRESSURE: 132 MMHG

## 2018-09-26 DIAGNOSIS — E66.9: ICD-10-CM

## 2018-09-26 DIAGNOSIS — F41.9: ICD-10-CM

## 2018-09-26 DIAGNOSIS — F17.200: ICD-10-CM

## 2018-09-26 DIAGNOSIS — K80.12: Primary | ICD-10-CM

## 2018-09-26 LAB
ABSOLUTE GRANULOCYTE CT: 6.4 /CUMM (ref 1.4–6.5)
BASOPHILS # BLD: 0 /CUMM (ref 0–0.2)
BASOPHILS NFR BLD: 0.5 % (ref 0–2)
EOSINOPHIL # BLD: 0.4 /CUMM (ref 0–0.7)
EOSINOPHIL NFR BLD: 4.4 % (ref 0–5)
ERYTHROCYTE [DISTWIDTH] IN BLOOD BY AUTOMATED COUNT: 14.7 % (ref 11.5–14.5)
GRANULOCYTES NFR BLD: 71.8 % (ref 42.2–75.2)
HCT VFR BLD CALC: 40.6 % (ref 37–47)
LYMPHOCYTES # BLD: 1.9 /CUMM (ref 1.2–3.4)
MCH RBC QN AUTO: 27.3 PG (ref 27–31)
MCHC RBC AUTO-ENTMCNC: 33.2 G/DL (ref 33–37)
MCV RBC AUTO: 82.1 FL (ref 81–99)
MONOCYTES # BLD: 0.2 /CUMM (ref 0.1–0.6)
PLATELET # BLD: 333 /CUMM (ref 130–400)
PMV BLD AUTO: 9.1 FL (ref 7.4–10.4)
RED BLOOD CELL CT: 4.95 /CUMM (ref 4.2–5.4)
WBC # BLD AUTO: 8.9 /CUMM (ref 4.8–10.8)

## 2018-09-26 PROCEDURE — G0378 HOSPITAL OBSERVATION PER HR: HCPCS

## 2018-09-26 NOTE — PN- GENERAL SURGERY
Subjective
Subjective:
Patient alert and oriented, complains of abdominal fullness
pain controlled, no appetite
VSS afebrile
 
Objective
Vital Signs and I&Os
Vital Signs
 
 
Date Time Temp Pulse Resp B/P B/P Pulse O2 O2 Flow FiO2
 
     Mean Ox Delivery Rate 
 
09/26 1623 98.8 76 18 123/62  96   
 
09/26 1501 98.2 88 18 110/68  98   
 
09/26 1300 98.2 77 18 107/56  99   
 
09/26 1100 98.2 88 18 105/63  98   
 
09/26 0940 98.2 62 18 110/68  99   
 
09/26 0629 97.8 85 18 116/55  95 Room Air  
 
09/26 0600      100 Room Air  
 
09/26 0441 97.8        
 
09/26 0420 97.8        
 
09/26 0248 97.8 94 16 133/88  98   
 
 
 Intake & Output
 
 
 09/26 1600 09/26 0800 09/26 0000 09/25 1600 09/25 0800 09/25 0000
 
Intake Total  0    
 
Output Total      
 
Balance  0    
 
       
 
Intake, Oral  0    
 
Patient  230 lb    
 
Weight      
 
 
 
Physical Exam:
alert and oriented, VSS
chest - CTA symmetric
heart- RRR without MRG
Abdomen - distended, generalized soreness
POS BS, dressings CDI
bilateral lower extremities soft with good perfusion
 
Assessment/Plan
Assessment/Plan
postop lap jaci
pain controlled
advance diet
D/C fluids when tolarating POs
OOB ambulating
patient in observation status- plan to be discharged tomorrow to home
 
 
 
Core Measures
 
Venous Thromboembolism
VTE Risk Factors Other
No Mechanical VTE Prophylaxis d/t Surgical Contraindication
No VTE Pharm Prophylaxis d/t Surgical Contraindication

## 2018-09-26 NOTE — OPERATIVE REPORT
Operative/Inv Procedure Report
Surgery Date: 09/26/18
Name of Procedure:
Laparoscopic Cholecystectomy
Pre-Operative Diagnosis:
Acute cholecystitis/Cholelithiasis
Post-Operative Diagnosis:
Same
Estimated Blood Loss: 50ml to 100ml
Surgeon/Assistant:
Gavino Grady DO
Anesthesia: general endotracheal tube
IV Fluids:
1500 cc
Drains:
None
Specimens:
Gallbladder
Complications:
None
Condition:
Stable
Operative Indication:
This is a 38-year-old female presented to the emergency room with abdominal 
pain.  After appropriate workup was completed the patient was diagnosed with 
acute cholecystitis.  A laparoscopic possible open cholecystectomy was discussed
in detail.  All risks including but not limited to bleeding, infection, bile 
leak, and injury to surrounding duct/bowel were discussed in detail.  The 
patient understood everything and decided to proceed.
 
Operative/Procedure Note
Note:
The patient was brought to the operating room and placed on the operating room 
table in supine position.  Venodyne stockings were placed and adequate general 
endotracheal anesthesia was obtained.  The patient was prepped and draped in 
standard surgical fashion.  We began the procedure by making a 2 cm transverse 
incision in the infraumbilical crease.  The incision was carried down to the 
fascia, once the fascia was clearly visualized it was picked up between 2 kocher
clamps.  The fascia was divided in the midline and once we entered the 
peritoneum 2 stay 0 Vicryl sutures were placed on each side.  A 12 mm blunt port
was inserted and the abdominal cavity was insufflated to 15 mmHg.  A 10 mm 30 
laparoscope was introduced and upon initial examination no obvious gross 
pathology was seen.  We did note a distended gallbladder in the right upper 
quadrant.  Accessory trocars were placed, all 5 mm, one in the epigastrium and 2
in the right upper quadrant (one in the midclavicular line and one in the 
anterior axillary line, both 2 fingerbreadths below the costal margin).  The 
gallbladder was grasped with the lateralmost trocar and retracted up over the 
liver.  Using the other 2 accessory trochars the infundibulum was grasped and 
the peritoneum was lysed using blunt dissection and using hook electrocautery.  
The cystic duct and cystic artery were visualized.  Of note the gallbladder was 
distended and a lot of edema/imflammatory reaction was present.  The common bile
duct was visualized and it was away from our area of dissection.  The cystic 
duct and artery were skeletonized and divided between clips, 3 clips to stay and
one clip on the gallbladder side for the duct and 2 clips to stay and one clip 
on the gallbladder side for the artery.  The gallbladder was dissected off the 
liver bed using hook electrocautery maintaining hemostasis.  Prior to completely
removing the gallbladder off the liver bed we examined the area of dissection no
obvious bile leak or bleeding was noted, the clips appeared to be in good 
position.  The gallbladder was completely detached from the liver bed.  We 
switched to a 5 mm laparoscope and a 10 mm Endobag was introduced through the 
umbilical trocar site.  The gallbladder was placed in the bag and removed 
through the umbilicus.  The abdomen was reinsufflated.  We switched back to a 10
mm laparoscope and examined our area of dissection.  No obvious bile leak or 
bleeding was noted.  Due to the acute inflammation there was oozing from the 
liver bed and that was controlled using cuatery and a piece of surgicelThe right
upper quadrant was irrigated until clear.  All ports were removed under direct 
visualization, no obvious bleeding was noted.  The umbilical trocar site was 
closed using 0 Vicryl suture.  The skin was closed using 4-0 Monocryl.  Steri-
Strips and dressings were placed.  The patient was successfully extubated and 
transferred to the recovery room in stable condition.  The patient tolerated the
procedure well with no complications.
Findings:
Acutely edematous, distended gallbladder, multiple gallstones
CC:
Ronni Antunez MD

## 2018-09-26 NOTE — CT SCAN REPORT
EXAMINATION:
CT ABDOMEN AND PELVIS WITHOUT CONTRAST
 
CLINICAL INFORMATION:
Right upper quadrant, right flank pain
 
COMPARISON:
None
 
TECHNIQUE:
Multidetector volumetric imaging was performed from the superior aspect of
the liver through the pubic symphysis. Sagittal and coronal reformatted
images were obtained on the technologist's workstation.
 
DLP:
1090 mGy-cm
 
FINDINGS:
 
LUNG BASES: The visualized lung bases are unremarkable.
 
Overall evaluation of the soft tissues is made limited given the lack of
intravenous contrast.
 
LIVER, GALLBLADDER, AND BILIARY TREE: The liver is normal in size, shape, and
attenuation. No focal hepatic lesion or biliary ductal dilatation is present.
Numerous peripherally calcified stones are seen within a mildly distended
gallbladder with associated mild gallbladder wall thickening and
pericholecystic infiltration.
 
PANCREAS: Unremarkable.
 
SPLEEN: Unremarkable.
 
ADRENAL GLANDS: Unremarkable.
 
KIDNEYS AND URETERS: The kidneys are normal in size, shape, and attenuation.
No hydronephrosis, hydroureter, or calculi seen. No perinephric stranding.
 
BLADDER: Under distended.
 
GASTROINTESTINAL TRACT: No evidence of bowel obstruction. Scattered colonic
diverticula noted without evidence of diverticulosis. Diffuse chronic stool.
Small and large bowel loops are otherwise unremarkable.
 
ABDOMINAL WALL: Fat-containing umbilical hernia.
 
LYMPH NODES: Normal.
 
VASCULAR: Unremarkable.
 
PELVIC VISCERA: Unremarkable.
 
OSSEOUS STRUCTURES: Unremarkable.
 
IMPRESSION:
 
Cholelithiasis with associated mild gallbladder wall thickening and
pericholecystic fat infiltration; in the appropriate clinical setting, these
findings are suspicious for acute cholecystitis.

## 2018-09-26 NOTE — ED GI/GU/ABDOMINAL COMPLAINT
**See Addendum**
History of Present Illness
 
General
Chief Complaint: Abdominal Pain/Flank Pain
Stated Complaint: RT SIDE FLANK PAIN X'S 12 HRS DENIES VOMITING
Source: patient
Exam Limitations: no limitations
 
Vital Signs & Intake/Output
Vital Signs & Intake/Output
 Vital Signs
 
 
Date Time Temp Pulse Resp B/P B/P Pulse O2 O2 Flow FiO2
 
     Mean Ox Delivery Rate 
 
09/27 0800      95 Room Air  
 
09/27 0630 97.1 68 20 117/70  97 Nasal 2.0L 
 
       Cannula  
 
09/27 0216 97.7 70 18 120/73  97 Room Air  
 
09/27 0000      94 Nasal 1.0L 
 
       Cannula  
 
09/26 2050 97.9 92 18 132/74  93 Nasal 3.0L 
 
       Cannula  
 
09/26 2033       Nasal 3.0L 
 
       Cannula  
 
09/26 1623 98.8 76 18 123/62  96   
 
09/26 1501 98.2 88 18 110/68  98   
 
09/26 1300 98.2 77 18 107/56  99   
 
09/26 1100 98.2 88 18 105/63  98   
 
 
 ED Intake and Output
 
 
 09/27 0000 09/26 1200
 
Intake Total 340 0
 
Output Total 0 
 
Balance 340 0
 
   
 
Intake,  
 
Intake, Oral 240 0
 
Number 0 
 
Bowel  
 
Movements  
 
Output, Urine 0 
 
Patient 230 lb 230 lb
 
Weight  
 
 
 
Allergies
Coded Allergies:
NO KNOWN ALLERGIES (02/16/17)
 
Triage Note:
PT COMING IN FROM HOME C/O INTERMITTENT RUQ ABD
 PAIN THAT RADIATES TO THE BACK SINCE 1400 TUESDAY.
 PT DENIES N/V/D.  PT DENIES ANY OTHER COMPLAINTS.
 PAIN 8/10.  PT GUARDING ABDOMEN.
Triage Nurses Notes Reviewed? yes
Pregnant? n
Is pt currently breastfeeding? No
Onset: Gradual
Duration: hour(s):
Timing: recent history
Location: right upper quadrant
Radiation: no radiation
Activities at Onset: none
Prior Abdominal Problems: similar symptoms
Modifying Factors:
Worsens With: movement, palpation. 
Associated Symptoms: abdominal pain
HPI:
37 yo woman
presents with 12 hours of right upper quadrant and right flank pain, without 
nausea, vomiting, diarrhea, fever, chest pain, or dysuria.  She is uncertain 
about inciting etiology.  She notes the pain is sharp and stabbing.
 
She is otherwise well. 
(Crystal MAGALLON,Clifton JORGE)
Reconcile Medications
Gabapentin (Neurontin) 300 MG CAPSULE   1 CAP PO QPM SLEEP  (Reported)
Oxycodone HCl/Acetaminophen (Percocet 5-325 MG Tablet) 5 MG-325 MG TABLET   1-2 
TAB PO Q4-6 PRN PAIN
Paroxetine HCl (Paxil) 20 MG TABLET   1 TAB PO QPM ANXIETY  (Reported)
 
(Regino Diaz MD)
 
Past History
 
Travel History
Traveled to [place] past 21 day No
 
Medical History
Any Pertinent Medical History? see below for history
Neurological: NONE
EENT: NONE
Cardiovascular: NONE
Respiratory: NONE
Gastrointestinal: NONE
Hepatic: NONE
Renal: NONE
Musculoskeletal: NONE
Psychiatric: anxiety
Endocrine: NONE
Blood Disorders: NONE
Cancer(s): NONE
GYN/Reproductive: EXCESSIVE BLEEDING
History of MRSA: No
History of VRE: No
History of CDIFF: No
 
Surgical History
Surgical History: hysterectomy, tubal ligation, endometrial ablation
 
Psychosocial History
Who do you live with Friend
What is your primary language English
Tobacco Use: Current Daily Use
Daily Tobacco Use Amount/Type: => 5 Cigarettes daily
 
Family History
Hx Contributory? No
(Crystal MAGALLON,Clifton JORGE)
 
Review of Systems
 
 
Physical Exam
 
Physical Exam
General Appearance: well developed/nourished, mild distress
Head: atraumatic, normal appearance
Eyes:
Bilateral: normal appearance. 
Ears, Nose, Throat, Mouth: hearing grossly normal, moist mucous membrane
Neck: normal inspection, supple, full range of motion, normal alignment
Respiratory: normal breath sounds, chest non-tender, no respiratory distress, 
quiet respiration, lungs clear
Cardiovascular: regular rate/rhythm
Gastrointestinal: normal bowel sounds, soft, ruq tenderness, right mid thoracic 
musculoskeletal tenderness. no rebound. no guarding. 
Back: normal inspection, normal range of motion
Extremities: normal range of motion
Neurologic/Psych: no motor/sensory deficits, awake, alert, oriented x 3
Skin: intact, normal color, warm/dry
 
Core Measures
ACS in differential dx? No
Sepsis Present: No
Sepsis Focused Exam Completed? No
(Crystal MAGALLON,Clifton JROGE)
 
Progress
Differential Diagnosis: appendicitis, biliary colic, kidney stone, UTI/pyelo
Plan of Care:
 Orders
 
 
Procedure Date/time Status
 
Low Fat Diet 09/27 B Active
 
OXYGEN SETUP (GEN) 09/27 2000 Complete
 
Discharge Patient 09/27  UNK Active
 
Nothing by Mouth 09/26 L Complete
 
Clear Liquid Diet 09/26 D Complete
 
Teach/Educate 09/26 2007 Active
 
Pain Treatment and Response 09/26 2007 Active
 
Nutritional Intake, Monitor 09/26 2007 Active
 
Isolation 09/26 2007 Active
 
Patient Care Conference 09/26 2007 Active
 
Pathway - chart 09/26 1952 Active
 
VTE Mechanical Prophylaxis 09/26 1952 Active
 
Vital Signs 09/26 1952 Active
 
Intake & Output 09/26 1952 Active
 
Activity/Ambulation 09/26 1952 Active
 
RT: Evaluation 09/26 1933 Active
 
Patient Data 09/26 1904 Active
 
Code Status 09/26 1904 Active
 
PATHOLOGY SPECIMEN 09/26 1753 Complete
 
FingerStick- Glucose 09/26 1624 Complete
 
TRANSFER ORDERS 09/26 1139 Complete
 
Patient Data 09/26 1122 Active
 
Place in observation 09/26 1016 Active
 
ED Holding Orders 09/26 1016 Active
 
Vital Signs 09/26 1016 Complete
 
Code Status 09/26 1016 Complete
 
Intake & Output 09/26 0329 Complete
 
OXYGEN SETUP CHG 09/26  UNK Complete
 
OXYGEN 09/26  UNK Complete
 
OXYGEN TRANSPORT 09/26  UNK Complete
 
VTE Mechanical Prophylaxis 09/26  UNK Complete
 
Vital Signs 09/26  UNK Complete
 
Intake & Output 09/26  UNK Complete
 
 
 Current Medications
 
 
  Sig/Chandra Start time  Last
 
Medication Dose  Stop Time Status Admin
 
Heparin Sodium  5,000 UNIT Q8 09/26 2200 AC 09/27
 
(Porcine)     0525
 
Gabapentin 300 MG QPM 09/26 2100 AC 09/27
 
(Neurontin)     0010
 
Paroxetine HCl 20 MG QPM 09/26 2100 AC 09/27
 
(Paxil)     0010
 
Acetaminophen 1,000 MG Q6P PRN 09/26 2000 AC 
 
(Ofirmev)     
 
Dextrose/Sodium  1,000 ML .Q10H 09/26 2000 AC 09/27
 
Chloride     0525
 
(D5W-1/2 Normal      
 
Saline 1000ML)     
 
Morphine Sulfate 2 MG Q2H PRN 09/26 2000 AC 09/27
 
(MORPHINE SULFATE)     0526
 
Ondansetron HCl 4 MG Q6P PRN 09/26 2000 AC 
 
(Zofran)     
 
 
 
Diagnostic Imaging:
Viewed by Me: CT Scan.  Discussed w/RAD: CT Scan. 
Initial ED EKG: low voltage in inferior leads, sinus, no acute changes
(Crystal MD,Clifton JORGE)
 
Departure
 
Departure
Disposition: STILL A PATIENT
Condition: Stable
Clinical Impression
Primary Impression: Abdominal pain
Referrals:
Harmony MAGALLON,Ronni WELLS (PCP/Family)
 
Departure Forms:
Customer Survey
General Discharge Information
Comments
pt signed out to dr. diaz, 9/26/18, 7am, ct scan pending. 
(Crystal MAGALLON,Clifton JORGE)
 
Observation Note
Spoke With:
Gavino Grady DO
Physician Advisor Notified: ZACARIAS BROWN DO
Place Patient In: Non-ED OBS Care Area
Rationale for Observation:
My rational for observation is as follows [the patient has ongoing severe right 
upper quadrant abdominal pain and tenderness.  She's been placed in observation 
for cholecystectomy.].
 
(Zacarias Brown DO)
 
Departure
Prescriptions:
Current Visit Scripts
Oxycodone HCl/Acetaminophen (Percocet 5-325 MG Tablet) 1-2 TAB PO Q4-6 PRN PAIN 
     #36 TAB 
 
 
(Emily MAGALLON, Regino)

## 2018-09-26 NOTE — HISTORY & PHYSICAL PRE-OP
Melinda Blood 09/26/18 1157:
General Information and HPI
Source of Information: patient
Exam Limitations: no limitations
History of Present Illness:
PT PRESENTS TO ED AFTER STARTING HAVING RUQ PAIN THAT STARTED YESTERDAY 
AFTERNOON.  STATES THAT SHE HAD SOME MILD NAUSEA BUT NO VOMITING.  DENIES 
FEVERS.  HAD A REGULAR BOWEL MOVEMENT YESTERDAY MORNING.  STATE THAT BEEN HAS 
BEEN GETTING PROGRESSIVELY WORSE, ONLY RELEIVED WITH PAIN MEDS IN ED.  HAS NEVER
HAD PAIN LIKE THIS BEFORE.  
 
Allergies/Medications
Allergies:
Coded Allergies:
NO KNOWN ALLERGIES (02/16/17)
 
Home Med list
Gabapentin (Neurontin) 300 MG CAPSULE   1 CAP PO QPM SLEEP  (Reported)
Paroxetine HCl (Paxil) 20 MG TABLET   1 TAB PO QPM ANXIETY  (Reported)
 
 
Past History
 
Medical History
Neurological: NONE
EENT: NONE
Cardiovascular: NONE
Respiratory: NONE
Gastrointestinal: NONE
Hepatic: NONE
Renal: NONE
Musculoskeletal: NONE
Psychiatric: anxiety
Endocrine: NONE
Blood Disorders: NONE
Cancer(s): NONE
GYN/Reproductive: EXCESSIVE BLEEDING
History of MRSA: No
History of VRE: No
History of CDIFF: No
 
Surgical History
Pertinent Surgical History: hysterectomy, tubal ligation, endometrial ablation
 
Review of Systems
Review of Systems:
AS PER HPI
 
Review of Systems
Constitutional:
Reports: no symptoms. 
Cardiovascular:
Reports: no symptoms. 
Respiratory:
Reports: no symptoms. 
GI:
Reports: see HPI, abdominal pain, nausea.  Denies: diarrhea, changes in stool, 
vomiting. 
 
Exam & Diagnostic Data
Last 24 Hrs of Vital Signs/I&O
 Vital Signs
 
 
Date Time Temp Pulse Resp B/P B/P Pulse O2 O2 Flow FiO2
 
     Mean Ox Delivery Rate 
 
09/26 0940 98.2 62 18 110/68  99   
 
09/26 0629 97.8 85 18 116/55  95 Room Air  
 
09/26 0600      100 Room Air  
 
09/26 0441 97.8        
 
09/26 0420 97.8        
 
09/26 0248 97.8 94 16 133/88  98   
 
 
 Intake & Output
 
 
 09/26 1600 09/26 0800 09/26 0000
 
Intake Total  0 
 
Output Total   
 
Balance  0 
 
    
 
Intake, Oral  0 
 
Patient  230 lb 
 
Weight   
 
 
 
Physical Exam:
GEN- NAD
HEENT- EOMI, MMM, NARES PATENT
RESP-CLEAR
CARDIAC-RRR
ABD-OBESE, +BS, SOFT, TENDER IN RUQ, NO REBOUND, MINIMAL GAURDING.  NEGATIVE 
MURPHYS SIGN
EXT- WARM AND DRY, NO CALF TENDERNESS
Last 24 Hrs of Labs/Colton:
 Laboratory Tests
 
09/26/18 0621:
Urine Color YEL, Urine Clarity CLEAR, Urine pH 6.0, Ur Specific Gravity >= 1.030
, Urine Protein NEG, Urine Ketones TRACE  H, Urine Nitrite NEG, Urine Bilirubin 
NEG, Urine Urobilinogen 0.2, Ur Leukocyte Esterase NEG, Ur Microscopic EXAM NOT 
REQUIRED, Urine Hemoglobin NEG, Urine Glucose NEG
 
09/26/18 0501:
Anion Gap 7, Estimated GFR > 60, BUN/Creatinine Ratio 11.4, Glucose 111  H, 
Calcium 8.2  L, Total Bilirubin 0.2, Direct Bilirubin 0.2, AST 15, ALT 29, 
Alkaline Phosphatase 104, Troponin I < 0.01, Total Protein 6.3, Albumin 3.4  L, 
Amylase 49, Lipase 177, Total Beta HCG NEGATIVE
 
09/26/18 0357:
CBC w Diff NO MAN DIFF REQ, RBC 4.95, MCV 82.1, MCH 27.3, MCHC 33.2, RDW 14.7  H
, MPV 9.1, Gran % 71.8, Lymphocytes % 21.6, Monocytes % 1.7, Eosinophils % 4.4, 
Basophils % 0.5, Absolute Granulocytes 6.4, Absolute Lymphocytes 1.9, Absolute 
Monocytes 0.2, Absolute Eosinophils 0.4, Absolute Basophils 0.0
 
 
Diagnostic Data
Other Results
  SERVICE DATE: 09/26/
EXAM TYPE: CAT - CT ABD & PELVIS W/O IV CONTRAS
 
EXAMINATION:
CT ABDOMEN AND PELVIS WITHOUT CONTRAST
 
CLINICAL INFORMATION:
Right upper quadrant, right flank pain
 
COMPARISON:
None
 
TECHNIQUE:
Multidetector volumetric imaging was performed from the superior aspect of
the liver through the pubic symphysis. Sagittal and coronal reformatted
images were obtained on the technologist's workstation.
 
DLP:
1090 mGy-cm
 
FINDINGS:
 
LUNG BASES: The visualized lung bases are unremarkable.
 
Overall evaluation of the soft tissues is made limited given the lack of
intravenous contrast.
 
LIVER, GALLBLADDER, AND BILIARY TREE: The liver is normal in size, shape, and
attenuation. No focal hepatic lesion or biliary ductal dilatation is present.
Numerous peripherally calcified stones are seen within a mildly distended
gallbladder with associated mild gallbladder wall thickening and
pericholecystic infiltration.
 
PANCREAS: Unremarkable.
 
SPLEEN: Unremarkable.
 
ADRENAL GLANDS: Unremarkable.
 
KIDNEYS AND URETERS: The kidneys are normal in size, shape, and attenuation.
No hydronephrosis, hydroureter, or calculi seen. No perinephric stranding.
 
BLADDER: Under distended.
 
GASTROINTESTINAL TRACT: No evidence of bowel obstruction. Scattered colonic
diverticula noted without evidence of diverticulosis. Diffuse chronic stool.
Small and large bowel loops are otherwise unremarkable.
 
ABDOMINAL WALL: Fat-containing umbilical hernia.
 
LYMPH NODES: Normal.
 
VASCULAR: Unremarkable.
 
PELVIC VISCERA: Unremarkable.
 
OSSEOUS STRUCTURES: Unremarkable.
 
IMPRESSION:
 
Cholelithiasis with associated mild gallbladder wall thickening and
pericholecystic fat infiltration; in the appropriate clinical setting, these
findings are suspicious for acute cholecystitis.
 
DICTATED BY: Cristo Wilson MD 
DATE/TIME DICTATED:09/26/18 / 0713
:RAD.[email or web address] 
DATE/TIME TRANSCRIBED:09/26/18 / 0713
 
Assessment/Plan
Assessment/Plan:
39YO F WITH RUQ ABD PAIN AND FINDINGS OF CHOLECYSTITIS WITH CHOLELITHIASIS ON 
CT.
 
ADMIT TO DR ALMAGUER 
PLAN FOR LAP JOJO LATER TODAY
NPO
IV UNASYN
IVF
OOB
REGULAR HOME MEDS
ALPS
 
As Ranked By This Provider
Problem List:
 1. Cholecystitis with cholelithiasis
 
 
 
Gavino Almaguer DO 09/26/18 1902:
Attending MD Review Statement
 
Attending Statement
Attending MD Statement: examined this patient, discuss w/resident/PA/NP, agreed 
w/resident/PA/NP, discussed with family, reviewed EMR data (avail), reviewed 
images
Attending Assessment/Plan:
Patient seen and examined, agree with above. RUQ pain since yesterday afternoon 
after lunch, got more severe and would not go away so came to the ED. Never had 
similar pain in the past. Felt better after pain meds. AVSS. Abd-soft, obese, +
RUQ tenderness. Labs ok. CT scan - cholelithiasis with inflammatory changes 
around the gallbladder. Patient with likely acute cholecystitis. NPO/IVF/IV Abx,
and plan for Lap jojo. D/W patient, family, and staff.

## 2018-09-27 VITALS — SYSTOLIC BLOOD PRESSURE: 120 MMHG | DIASTOLIC BLOOD PRESSURE: 73 MMHG

## 2018-09-27 VITALS — DIASTOLIC BLOOD PRESSURE: 70 MMHG | SYSTOLIC BLOOD PRESSURE: 120 MMHG

## 2018-09-27 VITALS — DIASTOLIC BLOOD PRESSURE: 70 MMHG | SYSTOLIC BLOOD PRESSURE: 117 MMHG

## 2018-09-27 NOTE — PN- STUDENT
Ella Pompa 09/27/18 0642:
Subjective
Subjective:
37 yo female POD 1 s/p lap cholecystectomy seen at bedside, no acute events 
overnight. Pt has no complaints of pain, only a little discomfort in the abd 
area. Pt has bveen ambulating by self, using IS, and voiding clear urine 
spontaneously overnight. No BM yet but feels ready for breakfast today. Only 
request is note for 's work for picking her up today from the hospital.
 
Denies SOB, chest pain, anusea, vomitting, anorexia, fever or chills
 
Objective
Objective:
 Vital Signs
 
 
Date Time Temp Pulse Resp B/P B/P Pulse O2 O2 Flow FiO2
 
     Mean Ox Delivery Rate 
 
09/27 0216 97.7 70 18 120/73  97 Room Air  
 
09/27 0000      94 Nasal 1.0L 
 
       Cannula  
 
09/26 2050 97.9 92 18 132/74  93 Nasal 3.0L 
 
       Cannula  
 
09/26 2033       Nasal 3.0L 
 
       Cannula  
 
09/26 1623 98.8 76 18 123/62  96   
 
09/26 1501 98.2 88 18 110/68  98   
 
09/26 1300 98.2 77 18 107/56  99   
 
09/26 1100 98.2 88 18 105/63  98   
 
09/26 0940 98.2 62 18 110/68  99   
 
 
 Intake & Output
 
 
 09/27 0800 09/27 0000 09/26 1600
 
Intake Total  340 
 
Output Total  0 
 
Balance  340 
 
    
 
Intake, IV  100 
 
Intake, Oral  240 
 
Number  0 
 
Bowel   
 
Movements   
 
Output, Urine  0 
 
Patient  230 lb 
 
Weight   
 
 
PE
Gen: middle aged female, overweight, laying in bed, NAD, aox4
CV: RRR, no m/r/g
Resp: good air movement, wheezes b/l unchanged from admission, no r/r
Abd: Mildly distended and appropriately tender, +BS, no ecchymosis, erythema, 
drainage, incisions c/d/i
Ext: +2 dp pulses b/l, no calf tenderness, alps in place
 
Results
Results:
Laboratory Tests
 
09/26/18 0621:
Urine Color YEL, Urine Clarity CLEAR, Urine pH 6.0, Ur Specific Gravity >= 1.030
, Urine Protein NEG, Urine Ketones TRACE  H, Urine Nitrite NEG, Urine Bilirubin 
NEG, Urine Urobilinogen 0.2, Ur Leukocyte Esterase NEG, Ur Microscopic EXAM NOT 
REQUIRED, Urine Hemoglobin NEG, Urine Glucose NEG
 
09/26/18 0501:
Anion Gap 7, Estimated GFR > 60, BUN/Creatinine Ratio 11.4, Glucose 111  H, 
Calcium 8.2  L, Total Bilirubin 0.2, Direct Bilirubin 0.2, AST 15, ALT 29, 
Alkaline Phosphatase 104, Troponin I < 0.01, Total Protein 6.3, Albumin 3.4  L, 
Amylase 49, Lipase 177, Total Beta HCG NEGATIVE
 
09/26/18 0357:
CBC w Diff NO MAN DIFF REQ, RBC 4.95, MCV 82.1, MCH 27.3, MCHC 33.2, RDW 14.7  H
, MPV 9.1, Gran % 71.8, Lymphocytes % 21.6, Monocytes % 1.7, Eosinophils % 4.4, 
Basophils % 0.5, Absolute Granulocytes 6.4, Absolute Lymphocytes 1.9, Absolute 
Monocytes 0.2, Absolute Eosinophils 0.4, Absolute Basophils 0.0
 
 
Assessment/Plan
Assessment:
37 yo f w/ hx of depression and lap hysterectomy POD 1 s/p lap cholecystectomy 
tolerated surgery well, no acute events overnight, stable.
Plan:
Pain management, transition to PO
D/c IVF, pt tolerating PO
Cont. regular diet
Cont. home meds
DVT ppx: sc hep, alps, oob
Encourage IS
D/c planning today to home
 
 
Karla Bui 09/27/18 0746:
Assessment/Plan
Assessment:
Agree with student assessment.
 
No acute overnight events.  No complaints of chest pain, shortness of breath. No
complaints of nausea or vomitting.  Has yet to eat regular food, is anticipating
breakfast this am.  No difficulty voiding overnight.  Has passed flatus.
 
Plan to be dc'd to home pending diet toleration
 
Ajay Iraheta 09/28/18 1154:
Resident Review Statement
Resident Statement: i did not see the patient, assigned as cosigner in error- 
POWER

## 2018-09-27 NOTE — SURGICAL DISCHARGE SUMMARY
Visit Information
 
Visit Dates
Admission Date:
09/26/18
 
Discharge Date:
09/27/2018
 
 
History of Present Illness
Chief Complaint:
Abdominal pain
 
Medical History
Neurological: NONE
EENT: NONE
Cardiovascular: NONE
Respiratory: NONE
Gastrointestinal: NONE
Hepatic: NONE
Renal: NONE
Musculoskeletal: NONE
Psychiatric: anxiety
Endocrine: NONE
Blood Disorders: NONE
Cancer(s): NONE
GYN/Reproductive: EXCESSIVE BLEEDING
History of MRSA: No
History of VRE: No
History of CDIFF: No
Isolation History: Standard
 
Surgical History
Pertinent Surgical History: hysterectomy, tubal ligation, endometrial ablation
 
Psychosocial History
Who Do You Live With? Friend
What is Your Primary Language? English
Review of Systems:
See H&P
 
Hospital Course
 
Course
Attending Physician:
Gavino Grady DO
 
Primary Care Physician:
Ronni Antunez MD
 
Hospital Course:
Dee presented to the ER with complaints of abdominal pain and was found to
have acute cholecystitis and cholelithiasis.  She was taken to the operating 
room and underwent a laparoscopic cholecystectomy.  Post operatively was placed 
in observation status for fluid resuscitaiton, iv pain control and iv antibiotic
administration as well as post operative monitoring.  She was transferred to a 
general surgical floor.  Her procedure was well tolerated.  She voided and 
passed flatus post op. Her diet was advanced and tolerated.  Her vital signs 
remained stable and within normal limits. She was deemed appropriate for 
discharge to home.
Allergies:
Coded Allergies:
NO KNOWN ALLERGIES (02/16/17)
 
 
Disposition Summary
 
Disposition
Principal Diagnosis:
Acute cholecystitis, cholelithiasis
Additional Diagnosis:
None
Discharge Disposition: home or self care
 
Discharge Instructions
 
General Discharge Information
Code Status: Full Code
Patient's Diet:
Regular, advance as tolerated
Patient's Activity:
As tolerated
Follow-Up Instructions/Appts:
Follow up with Dr. Grady in 2 weeks from date of surgery
 
Medications at Discharge
Discharge Medications:
Continue taking these medications:
Paroxetine HCl (Paxil) 20 MG TABLET
    1 Tablet ORAL Every night
    Comments:
       NOT GIVEN IN HOSPITAL
 
Gabapentin (Neurontin) 300 MG CAPSULE
    1 Capsule ORAL Every night
    Comments:
       NOT GIVEN IN HOSPITAL
 
Start taking the following new medications:
Oxycodone HCl/Acetaminophen (Percocet 5-325 MG Tablet) 5 MG-325 MG TABLET
    1-2 Tablet ORAL EVERY 4-6 HOURS as needed for PAIN
    Qty = 36
    No Refills